# Patient Record
(demographics unavailable — no encounter records)

---

## 2024-12-20 NOTE — HISTORY OF PRESENT ILLNESS
[FreeTextEntry1] : PRABHA DÍAZ is a 58 year old female here for a physical exam. [de-identified] : Her last physical exam was 8/2022  Vaccines: Tetanus is NOT up to date, 9/2014 Shingrix is up to date  Her last dentist visit was less than one year ago Her last eye doctor appointment was less than one year ago Her last dermatologist visit was less than one year ago  GYN visit is NOT up to date Mammogram is NOT up to date Colon cancer screening is NOT up to date, colonoscopy 2019 in [place], repeat recommended in 2024  Her diet is healthy overall Exercise: Peloton, walking, weights, rowing

## 2024-12-20 NOTE — HISTORY OF PRESENT ILLNESS
[FreeTextEntry1] : PRABHA DÍAZ is a 58 year old female here for a physical exam. [de-identified] : Her last physical exam was 8/2022  Vaccines: Tetanus is NOT up to date, 9/2014 Shingrix is up to date  Her last dentist visit was less than one year ago Her last eye doctor appointment was less than one year ago Her last dermatologist visit was less than one year ago  GYN visit is NOT up to date Mammogram is NOT up to date Colon cancer screening is NOT up to date, colonoscopy 2019 in [place], repeat recommended in 2024  Her diet is healthy overall Exercise: Peloton, walking, weights, rowing

## 2024-12-20 NOTE — HEALTH RISK ASSESSMENT
[0] : 2) Feeling down, depressed, or hopeless: Not at all (0) [PHQ-2 Negative - No further assessment needed] : PHQ-2 Negative - No further assessment needed [Never] : Never [[id number]] : 0

## 2024-12-20 NOTE — HEALTH RISK ASSESSMENT
[0] : 2) Feeling down, depressed, or hopeless: Not at all (0) [PHQ-2 Negative - No further assessment needed] : PHQ-2 Negative - No further assessment needed [Never] : Never [[id number]] : 0

## 2024-12-20 NOTE — PLAN
[FreeTextEntry1] : Check labs as above.   Reviewed age-appropriate preventive screening tests with patient. She plans to follow up with GYN (Dr. Moreau). She is due for a mammogram and requests an order for this today.  She agreed to Tdap but declined a flu shot.  Discussed clean eating (eg Mediterranean style eating plan) and regular exercise/staying as physically active as possible.  Include balance exercises and strength training and core strengthening exercises for bone health and to decrease risk for falls.  Reviewed importance of good self care (e.g. meditation, yoga, adequate rest, regular exercise, magnesium, clean eating, etc.).  Follow up for next physical in one year.

## 2024-12-20 NOTE — ASSESSMENT
[FreeTextEntry1] : PRABHA DÍAZ is a 58 year old female here for a physical exam.  She has a history of ulcerative colitis. She is taking Apriso at her last physical but is planning to follow up with GI to see if this is necessary. [facility]  She declined an EKG today.

## 2024-12-20 NOTE — ASSESSMENT
[FreeTextEntry1] : PRABHA DÍAZ is a 58 year old female here for a physical exam.  She has a history of ulcerative colitis. She is taking Apriso at her last physical but is planning to follow up with GI to see if this is necessary. She is planning to see a GI in [facility].  She declined an EKG today.

## 2025-02-14 NOTE — HISTORY OF PRESENT ILLNESS
[de-identified] :  CC: Epistaxis   HISTORY OF PRESENT ILLNESS:  59F presenting with epistaxis. Symptoms primarily on right side and typically happens when leaning forward. Notices that there are also some clots/clumps of blood forming. Last bleed was 1.5 weeks ago. Episodes lasts for 5 minutes on average. She usually holds pressure to stop the bleeding. No history of hypertension, not on any anticoagulants. She does have history of trauma to the nose but no history of nasal surgery.   REVIEW OF SYSTEMS: General ROS: negative for - chills, fatigue or fever Psychological ROS: negative for - anxiety or depression Ophthalmic ROS: negative for - blurry vision, decreased vision or double vision ENT ROS: negative except as noted from HPI  Allergy and Immunology ROS: negative except as noted from HPI Hematological and Lymphatic ROS: negative for - bleeding problems Endocrine ROS: negative for - malaise/lethargy Respiratory ROS: negative for - stridor Cardiovascular ROS: negative for - chest pain Gastrointestinal ROS: negative for - appetite loss or nausea/vomiting Genitourinary ROS: negative for - incontinence Musculoskeletal ROS: negative for - gait disturbance Neurological ROS: negative for - behavioral changes Dermatological ROS: negative for - nail changes  Physical Exam: GENERAL APPEARANCE: Well-developed and No Acute Distress. COMMUNICATION: Able to Communicate. Strong Voice. HEAD AND FACE Eyes: Testing of ocular motility including primary gaze alignment normal. Inspection and Appearance: No evidence of lesions or masses Palpation: Palpation of the face reveals no sinus tenderness Salivary Glands: Symmetric without masses Facial Strength: Symmetric without evidence of facial paralysis  EAR, NOSE, MOUTH, and THROAT: Ear Canals and Tympanic Membranes, Bilateral: No evidence of inflammation or lesions. AU EAC clear TMI Thresholds: Clinical speech reception thresholds normal. External, Nose and Auricle: No lesions or masses. NECK: Evaluation: No evidence of masses or crepitus. The neck is symmetric and the trachea is in the midline position. Thyroid: No evidence of enlargement, tenderness or mass. Neck Lymph Nodes: WNL. Respiratory: Inspection of the chest including symmetry, expansion and/or assessment of respiratory effort normal. Cardiovascular: Evaluation of peripheral vascular system by observation and palpation of capillary refill, normal. Neurological/Psychiatric: Alert, Oriented, Mood, and Affect Normal.    PROCEDURE: PROCEDURE: Nasal endoscopy (07730) SURGEON: Miky Sexton MD Prior to the procedure, I had a discussion with the patient regarding the risks, benefits, and alternatives of the procedure and a verbal consent was obtained.  After obtaining adequate decongestion of the nasal mucosa with topical Epinephrine and adequate anesthesia with topical Lidocaine nasal spray, the nasal endoscope was used to examine the nasal passages and paranasal sinuses. The endoscope was passed along the floor of the nose bilaterally to evaluate the inferior meatus, floor of the nose, inferior turbinate, and nasopharynx. The scope was then passed superiorly to evaluate the area of the sphenoethmoidal recess, superior turbinate and superior meatus. As the scope was withdrawn anteriorly, the middle turbinate and middle meatus were carefully inspected. The endoscope was withdrawn and the patient tolerated the procedure well. No complications were encountered.  INSTRUMENTS:  EXAM FINDINGS:  -deviated septum to right -dilated vessels   IMPRESSION: -  PLAN: -cauterization in office today -avoid blowing nose, avoid digital trauma  -nasal saline and ocean spray -apply Lanolin BID -in event of nose bleed, apply direct pressure to nose for ten minutes

## 2025-03-03 NOTE — HISTORY OF PRESENT ILLNESS
[FreeTextEntry1] : 59  with LMP 2016 for new gyn visit  OB: 1985 Primary c/s for twins at 28 weeks  GYN: No Abnormal Paps.  Paraguard IUD in place. 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 [PapSmeardate] : ? [BoneDensityDate] : no [ColonoscopyDate] : yes

## 2025-03-03 NOTE — HISTORY OF PRESENT ILLNESS
[FreeTextEntry1] : 59  with LMP 2016 for new gyn visit  OB: 1985 Primary c/s for twins at 28 weeks  GYN: No Abnormal Paps.  Paraguard IUD in place. 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 [PapSmeardate] : ? [BoneDensityDate] : no [ColonoscopyDate] : yes

## 2025-03-03 NOTE — DISCUSSION/SUMMARY
[FreeTextEntry1] : Health Maintenance: -Pap with HPV -Mammo Rx -TBSE -colonoscopy guidelines reviewed with patient. Pt has upcoming colonoscopy so rectal deferred. -Achieve Vit D levels of 30-40, intake of 1100 mg daily calcium mostly thru dark green leafy greens and milk products, exercise 30 minutes TIW  IUD: strings seen US pelvis ordered now to assess location Given Paraguard IUD with copper, suggest removal GC/chl and NAAT culture performed Will treat prior to removal prn  RTO 4 weeks for IUD removal. (pt did not make appt) Pt will need cytotec and xanax as she is fearful of exam. Will attempt in office.

## 2025-04-21 NOTE — PROCEDURE
[IUD Removal] : intrauterine device (IUD) removal [Hysteroscopic Retrieval of IUD] : hysteroscopic retrieval [Time out performed] : Pre-procedure time out performed.  Patient's name, date of birth and procedure confirmed. [Consent Obtained] : Consent obtained [Fertility Desired] : fertility desired [ IUD] :  IUD [Menorrhagia] : menorrhagia [DUB] : DUB [Risks] : risks [Benefits] : benefits [Alternatives] : alternatives [Patient] : patient [Strings Visualized] : strings visualized [IUD Discarded] : IUD discarded [Tolerated Well] : Patient tolerated the procedure well [No Complications] : no complications [Heavy Vaginal Bleeding] : for heavy vaginal bleeding